# Patient Record
Sex: FEMALE | Race: WHITE
[De-identification: names, ages, dates, MRNs, and addresses within clinical notes are randomized per-mention and may not be internally consistent; named-entity substitution may affect disease eponyms.]

---

## 2017-09-05 ENCOUNTER — HOSPITAL ENCOUNTER (OUTPATIENT)
Dept: HOSPITAL 31 - C.SDS | Age: 52
Discharge: HOME | End: 2017-09-05
Attending: OTOLARYNGOLOGY
Payer: MEDICAID

## 2017-09-05 VITALS
SYSTOLIC BLOOD PRESSURE: 140 MMHG | HEART RATE: 90 BPM | TEMPERATURE: 98 F | OXYGEN SATURATION: 99 % | DIASTOLIC BLOOD PRESSURE: 75 MMHG

## 2017-09-05 VITALS — RESPIRATION RATE: 18 BRPM

## 2017-09-05 VITALS — BODY MASS INDEX: 29.8 KG/M2

## 2017-09-05 DIAGNOSIS — J34.3: ICD-10-CM

## 2017-09-05 DIAGNOSIS — J32.0: ICD-10-CM

## 2017-09-05 DIAGNOSIS — J34.2: Primary | ICD-10-CM

## 2017-09-05 PROCEDURE — 30520 REPAIR OF NASAL SEPTUM: CPT

## 2017-09-05 PROCEDURE — 31256 EXPLORATION MAXILLARY SINUS: CPT

## 2017-09-05 PROCEDURE — 31254 NSL/SINS NDSC W/PRTL ETHMDCT: CPT

## 2017-09-05 PROCEDURE — 88304 TISSUE EXAM BY PATHOLOGIST: CPT

## 2017-09-05 NOTE — OP
PROCEDURE DATE:  09/05/2017



PREOPERATIVE DIAGNOSES:  Deviated septum, enlarged turbinates, sinusitis.



POSTOPERATIVE DIAGNOSES:  Deviated septum, enlarged turbinates, sinusitis.



PROCEDURES:  Endoscopic bilateral maxillary antrostomy, endoscopic

bilateral sphenoidotomy, endoscopic bilateral frontal sinusotomy,

endoscopic bilateral ethmoidectomy, endoscopic bilateral inferior turbinate

reduction, septoplasty.



SIGNIFICANT FINDINGS:  Deviated septum, enlarged inferior turbinate,

sinusitis changes noted at the ethmoid sinuses, maxillary antrum stenosed

on both sides, frontal recess stenosed on both sides, sphenoid antrum

stenosed on both sides.



PROCEDURE:  The patient was brought into the room, placed in supine

position, anesthesia was initiated through an ET tube.  Navigation was set

up and used throughout the case in order to ensure that the skull base and

orbit were not entered.  Adrenaline-soaked pledgets were inserted into the

nasal cavity, remained there for at least 5 minutes and removed.  The

patient was draped in the usual manner.  The septum was injected with

lidocaine with epinephrine on both sides.  A Big Clifty's incision was made on

the left side of the septum, a mucoperichondrial flap was raised.  A

vertical incision was made in the cartilage leaving a 1.5 cm anterior and

superior strut and a mucoperichondrial flap was raised on the other side. 

Deviated portion of the cartilage and bones were removed.  A quilting

suture was used to suture the 2 flaps together and close the killians incision

.  A 0-degree scope was inserted into the nasal cavity.  The inferior 

turbinates were noted to be enlarged on both sides.  They were reduced 

first on the right then on the left using scissors, going from an inferior to 
superior and anterior to

posterior direction.  Bleeding was controlled using suction cautery. 

Attention was turned to the left.  The middle turbinate was injected with

lidocaine with epinephrine and medialized.  The uncinate process was

medialized using a Elkin elevator and removed using forceps.  A debrider

was used to enter the ethmoid bulla inferomedially going posteriorly to the

basal lamella and then going superiorly and anteriorly until the ethmoid

bulla was removed.  The basal lamella was entered.  Posterior ethmoid cells

were entered and opened.  The skull base was identified and followed

anteriorly all the way to the area of anterior ethmoid air cells.  The

frontal recess was noted to be stenosed and opened using forceps.  Suction

was used to locate the sphenoid antrum, which was noted to be stenosed and

opened using forceps.  The maxillary antrum was located using a curved

suction and opened using forceps.  Attention was turned to the other side. 

The middle turbinate was injected with lidocaine with epinephrine and

medialized.  The uncinate process was medialized using a Elkin elevator and

removed.  A debrider was use to enter the ethmoid bulla inferomedially

going posteriorly to the basal lamella then anteriorly and superiorly until

the ethmoid bulla was removed.  The basal lamella was entered.  Posterior

ethmoid cells were entered and opened, skull base was identified and

followed anteriorly all the way to the area of the anterior ethmoid air

cells.  The frontal recess was noted to be stenosed and opened using

forceps.  The sphenoid antrum was located using a suction, noted to be

stenosed and opened using forceps.  A curved suction was used to locate the

maxillary antrum, which was noted to be stenosed and opened using forceps. 

Bleeding was controlled using adrenaline-soaked pledgets and suction

cautery.  Splints were placed.  Stents were placed.  The patient was taken

off anesthesia and taken to recovery room in a stable manner.





__________________________________________

Babak Behin, MD







DD:  09/05/2017 9:52:35

DT:  09/05/2017 10:35:40

Job # [id number]



KEVIN

## 2018-08-07 ENCOUNTER — HOSPITAL ENCOUNTER (EMERGENCY)
Dept: HOSPITAL 14 - H.ER | Age: 53
Discharge: HOME | End: 2018-08-07
Payer: COMMERCIAL

## 2018-08-07 VITALS
SYSTOLIC BLOOD PRESSURE: 139 MMHG | OXYGEN SATURATION: 97 % | HEART RATE: 79 BPM | RESPIRATION RATE: 20 BRPM | DIASTOLIC BLOOD PRESSURE: 84 MMHG | TEMPERATURE: 98.3 F

## 2018-08-07 VITALS — BODY MASS INDEX: 29.8 KG/M2

## 2018-08-07 DIAGNOSIS — I10: ICD-10-CM

## 2018-08-07 DIAGNOSIS — M54.9: Primary | ICD-10-CM

## 2018-08-07 PROCEDURE — 99283 EMERGENCY DEPT VISIT LOW MDM: CPT

## 2018-08-07 PROCEDURE — 96372 THER/PROPH/DIAG INJ SC/IM: CPT

## 2018-08-07 RX ADMIN — OXYCODONE HYDROCHLORIDE AND ACETAMINOPHEN ONE TAB: 5; 325 TABLET ORAL at 11:56

## 2018-08-07 NOTE — ED PDOC
HPI: Back


Time Seen by Provider: 08/07/18 09:33


Chief Complaint (Nursing): Back Pain


History Per: Patient


History/Exam Limitations: no limitations


Onset/Duration Of Symptoms: Days (2)


Current Symptoms Are (Timing): Still Present


Quality Of Discomfort: Dull


Severity: Moderate


Pain Scale Rating Of: 4


Previous Symptoms: Back Pain


Associated Symptoms: None


Exacerbating Factor(s): Turning, Movement, Standing


Additional History Per: Patient


Additional Complaint(s): 





Patient presenting with lower back pain for 2 days. Right sided constant lower 

back pain radiating to right upper leg. Worse with walking and movement. No 

fever, no urinary problems, no numbness/weakness.





- Risk Factors


AAA Risk Factors: 


   Neg: Hypertension, Prior AAA





Past Medical History


Reviewed: Historical Data, Nursing Documentation, Vital Signs


Vital Signs: 


 Last Vital Signs











Temp  98.3 F   08/07/18 09:21


 


Pulse  79   08/07/18 09:21


 


Resp  20   08/07/18 09:21


 


BP  139/84   08/07/18 09:21


 


Pulse Ox  97   08/07/18 09:21














- Medical History


PMH: No Chronic Diseases


   Denies: Chronic Kidney Disease





- Surgical History


Other surgeries: sinuses





- Family History


Family History: States: Unknown Family Hx





- Home Medications


Home Medications: 


 Ambulatory Orders











 Medication  Instructions  Recorded


 


Cyclobenzaprine [Cyclobenzaprine 10 mg PO Q8 PRN #12 tab 09/22/16





HCl]  


 


Ibuprofen [Motrin] 600 mg PO TID PRN #30 tab 09/22/16


 


traMADol [Ultram] 50 mg PO Q8 PRN #12 tab 09/22/16


 


Cyclobenzaprine [Cyclobenzaprine 10 mg PO TID #15 tab 08/07/18





HCl]  


 


Naproxen 500 mg PO BID #20 tab 08/07/18














- Allergies


Allergies/Adverse Reactions: 


 Allergies











Allergy/AdvReac Type Severity Reaction Status Date / Time


 


No Known Allergies Allergy   Verified 09/22/16 15:24














Review of Systems


ROS Statement: Except As Marked, All Systems Reviewed And Found Negative





Physical Exam





- Reviewed


Nursing Documentation Reviewed: Yes


Vital Signs Reviewed: Yes





- Physical Exam


Appears: Positive for: Non-toxic, No Acute Distress


Head Exam: Positive for: ATRAUMATIC, NORMAL INSPECTION


Skin: Positive for: Warm, Dry


Eye Exam: Positive for: EOMI


Neck: Positive for: Painless ROM, Supple


Cardiovascular/Chest: Positive for: Regular Rate, Rhythm


Gastrointestinal/Abdominal: Positive for: Soft.  Negative for: Tenderness


Back: Positive for: Muscle Spasm, Other (right lower back paraspinal tenderness

, SLR positive).  Negative for: L CVA Tenderness, R CVA Tenderness


Extremity: Positive for: Normal ROM


Neurologic/Psych: Positive for: Alert, Oriented





- ECG


O2 Sat by Pulse Oximetry: 97





- Progress


Re-evaluation Time: 12:26


Condition: Re-examined, Improved





Medical Decision Making


Medical Decision Making: 


Impression





Lower back pain





Diff include sciatica, lumbar radiculopathy














Disposition





- Clinical Impression


Clinical Impression: 


 Back pain








- Patient ED Disposition


Is Patient to be Admitted: No


Counseled Patient/Family Regarding: Studies Performed, Diagnosis, Need For 

Followup





- Disposition


Referrals: 


Rajani Davis MD [Primary Care Provider] - 


Disposition: Routine/Home


Disposition Time: 12:26


Condition: IMPROVED


Additional Instructions: 


Take your medications as instructed. Follow up with your PCP in 2-3 days. 


Prescriptions: 


Cyclobenzaprine [Cyclobenzaprine HCl] 10 mg PO TID #15 tab


Naproxen 500 mg PO BID #20 tab


Instructions:  Low Back Pain  (DC)


Print Language: Russian